# Patient Record
Sex: FEMALE | Race: WHITE | NOT HISPANIC OR LATINO | Employment: OTHER | URBAN - METROPOLITAN AREA
[De-identification: names, ages, dates, MRNs, and addresses within clinical notes are randomized per-mention and may not be internally consistent; named-entity substitution may affect disease eponyms.]

---

## 2017-01-17 ENCOUNTER — TELEPHONE (OUTPATIENT)
Dept: FAMILY MEDICINE | Facility: CLINIC | Age: 77
End: 2017-01-17

## 2017-01-17 NOTE — TELEPHONE ENCOUNTER
Pt will need a face to face within 30d of admittance to fill our 90L. Will call pt's spouse tomorrow to get more info and offer to schedule appt,

## 2017-01-17 NOTE — TELEPHONE ENCOUNTER
----- Message from Magaly An sent at 1/17/2017 11:17 AM CST -----  Contact: spouse  Spouse came by today to inform Noemí that his wife will have paperwork coming from social security to be filled out please call when completed He also states he is trying to get the wife into [facility].

## 2017-01-17 NOTE — TELEPHONE ENCOUNTER
----- Message from Magaly An sent at 1/17/2017 11:17 AM CST -----  Contact: spouse  Spouse came by today to inform Noemí that his wife will have paperwork coming from social security to be filled out please call when completed He also states he is trying to get the wife into [facility].

## 2017-01-27 NOTE — TELEPHONE ENCOUNTER
Again, attempt to contact pt's spouse and LM for him to call back.   See previous msg.    Pt needs appt with dr only, not NP if for Nursing home admit.

## 2017-01-27 NOTE — TELEPHONE ENCOUNTER
Lm for pt's  to return call.  This msg been in box for week.   Is there any other recommendations.

## 2017-01-27 NOTE — TELEPHONE ENCOUNTER
I've not seen any paperwork on her and she will have to have an office visit. She will have to see MD to have 90L filled out. Cannot be done by NP.  She will have to see Dr. Olmos or Rubina.

## 2017-02-22 ENCOUNTER — TELEPHONE (OUTPATIENT)
Dept: FAMILY MEDICINE | Facility: CLINIC | Age: 77
End: 2017-02-22

## 2017-02-22 NOTE — TELEPHONE ENCOUNTER
"I called pt's  to schedule appt for him.  He told me that pt was sent home from nursing home.  Said they "wouldn't keep her anymore".  He said she had fallen out of a chair and had a black eye.    I just wanted to notify you.  "

## 2017-02-26 ENCOUNTER — NURSE TRIAGE (OUTPATIENT)
Dept: ADMINISTRATIVE | Facility: CLINIC | Age: 77
End: 2017-02-26

## 2017-02-26 NOTE — TELEPHONE ENCOUNTER
EC calling states patient is experiencing decrease appetite, severe abdominal cramping (administered Vicodin) and severe diarrhea 4 or more times every day since last Friday. EC advised per OOC protocol verbalized understanding, vehemently disagreed with recommendations to seek medical care at ED of choice for medical evaluation/treatment/intervention of current symptoms; EC request prescriptive alteratives from MD. On-call provider paged for notification/advisement.

## 2017-02-26 NOTE — TELEPHONE ENCOUNTER
"EC advised per OOC protocol verbalized understanding, continues to disagreed with OOC care and on-call providers advice/recommendations states he has tried OTC Imodium at has not been effective and wanted the doctor to prescribed "little white pill". EC refused further care advice and ended call.  "

## 2017-02-26 NOTE — TELEPHONE ENCOUNTER
"  Reason for Disposition   [1] SEVERE abdominal pain (e.g., excruciating) AND [2] present > 1 hour    Answer Assessment - Initial Assessment Questions  1. DIARRHEA SEVERITY: "How bad is the diarrhea?" "How many extra stools have you had in the past 24 hours than normal?"     - MILD: Few loose or mushy BMs; increase of 1-3 stools over normal daily number of stools; mild increase in ostomy output.    - MODERATE: Increase of 4-6 stools daily over normal; moderate increase in ostomy output.    - SEVERE (or Worst Possible): Increase of 7 or more stools daily over normal; moderate increase in ostomy output; incontinence.      4 or more times daily  2. ONSET: "When did the diarrhea begin?"       Friday   3. BM CONSISTENCY: "How loose or watery is the diarrhea?"       Watery   4. VOMITING: "Are you also vomiting?" If so, ask: "How many times in the past 24 hours?"       No   5. ABDOMINAL PAIN: "Are you having any abdominal pain?" If yes: "What does it feel like?" (e.g., crampy, dull, intermittent, constant)       Yes.   6. ABDOMINAL PAIN SEVERITY: If present, ask: "How bad is the pain?"  (e.g., Scale 1-10; mild, moderate, or severe)     - MILD (1-3): doesn't interfere with normal activities, abdomen soft and not tender to touch      - MODERATE (4-7): interferes with normal activities or awakens from sleep, tender to touch      - SEVERE (8-10): excruciating pain, doubled over, unable to do any normal activities        Severe.  states he administered "Vicodin" patient has dementia.   7. ORAL INTAKE: If vomiting, "Have you been able to drink liquids?" "How much fluids have you had in the past 24 hours?"      No   8. HYDRATION: "Any signs of dehydration?" (e.g., dry mouth [not just dry lips], too weak to stand, dizziness, new weight loss) "When did you last urinate?"      No   9. EXPOSURE: "Have you traveled to a foreign country recently?" "Have you been exposed to anyone with diarrhea?" "Could you have eaten any food that " "was spoiled?"      No   10. OTHER SYMPTOMS: "Do you have any other symptoms?" (e.g., fever, blood in stool)        No   11. PREGNANCY: "Is there any chance you are pregnant?" "When was your last menstrual period?"        n/a    Protocols used: ST DIARRHEA-A-    "

## 2017-02-26 NOTE — TELEPHONE ENCOUNTER
Spoke with [facility] on-call provider Dr. Jain states to advised patient to take PTC Imodium one (1) tablet everu six hours for symptom relief and to be evaluated by physician in the morning.

## 2017-03-08 ENCOUNTER — TELEPHONE (OUTPATIENT)
Dept: FAMILY MEDICINE | Facility: CLINIC | Age: 77
End: 2017-03-08

## 2017-03-08 NOTE — TELEPHONE ENCOUNTER
Spoke to , states the hospice doctor took care of it, no further needs at this time, pt is doing fine.

## 2017-03-08 NOTE — TELEPHONE ENCOUNTER
"MD/MD staff-Please review EPIC encounter of patient's symptom(s)/health concern addressed per [facility] and subsequent disposition. If deemed necessary or desired, please contact patient with direct medical advisement. Thank you (Routing comment)       EC advised per OOC protocol verbalized understanding, continues to disagreed with OOC care and on-call providers advice/recommendations states he has tried OTC Imodium at has not been effective and wanted the doctor to prescribed "little white pill". EC refused further care advice and ended call.         Attempted to contact pt and no avail to LM.  Mobile number is wrong number.       "

## 2017-10-20 ENCOUNTER — TELEPHONE (OUTPATIENT)
Dept: FAMILY MEDICINE | Facility: CLINIC | Age: 77
End: 2017-10-20

## 2017-10-20 NOTE — TELEPHONE ENCOUNTER
----- Message from Yadira Cortés sent at 10/20/2017  9:35 AM CDT -----  Pt  Zaki Ibarra came in today wanting to find out if Noemí could help him get the last 2 MRI's that his wife had done.  He knows that he needs to get that from the [facility]. Mr Ibarra states that the problem that he is having is that his wife has Dementia and he can't afford to pay for POA at this time since its over $1000.  He was hoping to be able to have that ordered and sent here where he can pick that up since Noemí is aware is the issue.  Mr Ibarra said to let you know that he has found a Dr in [place] he feels can help her with her problem and he would like to let him look over the MRI and go from there.  He had to rush back home before the nurse aid leaves for the morning and ask if someone can please contact him on his cell #  [phone] and let him know if this is possible.    Thank you

## 2017-10-20 NOTE — TELEPHONE ENCOUNTER
Notified pt  that I only have access to print report-  Will have disc burned to be picked up in [place].Spoke with Sarah who states she will have burned for pt  to  today.